# Patient Record
Sex: MALE | URBAN - METROPOLITAN AREA
[De-identification: names, ages, dates, MRNs, and addresses within clinical notes are randomized per-mention and may not be internally consistent; named-entity substitution may affect disease eponyms.]

---

## 2024-09-01 ENCOUNTER — CLAIMS CREATED FROM THE CLAIM WINDOW (OUTPATIENT)
Dept: URBAN - METROPOLITAN AREA MEDICAL CENTER 12 | Facility: MEDICAL CENTER | Age: 79
End: 2024-09-01
Payer: MEDICARE

## 2024-09-01 DIAGNOSIS — K92.1 HEMATOCHEZIA: ICD-10-CM

## 2024-09-01 DIAGNOSIS — K63.3 APHTHOUS ULCER OF COLON: ICD-10-CM

## 2024-09-01 DIAGNOSIS — E87.8 ELECTROLYTE IMBALANCE: ICD-10-CM

## 2024-09-01 DIAGNOSIS — D64.89 ANEMIA DUE TO OTHER CAUSE: ICD-10-CM

## 2024-09-01 PROCEDURE — 99233 SBSQ HOSP IP/OBS HIGH 50: CPT

## 2024-09-01 PROCEDURE — G0316 ESRD RELATED SVS 1VIS/12-19Y: HCPCS

## 2024-09-02 ENCOUNTER — CLAIMS CREATED FROM THE CLAIM WINDOW (OUTPATIENT)
Dept: URBAN - METROPOLITAN AREA MEDICAL CENTER 12 | Facility: MEDICAL CENTER | Age: 79
End: 2024-09-02
Payer: MEDICARE

## 2024-09-02 DIAGNOSIS — K63.3 APHTHOUS ULCER OF COLON: ICD-10-CM

## 2024-09-02 DIAGNOSIS — D64.89 ANEMIA DUE TO OTHER CAUSE: ICD-10-CM

## 2024-09-02 DIAGNOSIS — E87.8 ELECTROLYTE IMBALANCE: ICD-10-CM

## 2024-09-02 DIAGNOSIS — K92.1 HEMATOCHEZIA: ICD-10-CM

## 2024-09-02 PROCEDURE — 99233 SBSQ HOSP IP/OBS HIGH 50: CPT

## 2024-09-05 ENCOUNTER — DASHBOARD ENCOUNTERS (OUTPATIENT)
Age: 79
End: 2024-09-05

## 2024-09-05 ENCOUNTER — TELEPHONE ENCOUNTER (OUTPATIENT)
Dept: URBAN - METROPOLITAN AREA CLINIC 92 | Facility: CLINIC | Age: 79
End: 2024-09-05

## 2024-09-10 ENCOUNTER — TELEPHONE ENCOUNTER (OUTPATIENT)
Dept: URBAN - METROPOLITAN AREA CLINIC 92 | Facility: CLINIC | Age: 79
End: 2024-09-10

## 2024-09-11 ENCOUNTER — TELEPHONE ENCOUNTER (OUTPATIENT)
Dept: URBAN - METROPOLITAN AREA CLINIC 92 | Facility: CLINIC | Age: 79
End: 2024-09-11

## 2024-09-25 ENCOUNTER — OFFICE VISIT (OUTPATIENT)
Dept: URBAN - METROPOLITAN AREA TELEHEALTH 2 | Facility: TELEHEALTH | Age: 79
End: 2024-09-25

## 2024-09-25 VITALS — BODY MASS INDEX: 27.85 KG/M2 | WEIGHT: 188 LBS | HEIGHT: 69 IN

## 2024-09-25 RX ORDER — ROSUVASTATIN CALCIUM 40 MG/1
1 TABLET TABLET, FILM COATED ORAL ONCE A DAY
Status: ACTIVE | COMMUNITY

## 2024-09-25 RX ORDER — SERTRALINE HYDROCHLORIDE 100 MG/1
1 TABLET TABLET, FILM COATED ORAL ONCE A DAY
Status: ACTIVE | COMMUNITY

## 2024-09-25 RX ORDER — PANTOPRAZOLE SODIUM 40 MG/1
1 TABLET TABLET, DELAYED RELEASE ORAL ONCE A DAY
Status: ACTIVE | COMMUNITY

## 2024-09-25 RX ORDER — CLOPIDOGREL 75 MG/1
1 TABLET TABLET, FILM COATED ORAL ONCE A DAY
Status: ACTIVE | COMMUNITY

## 2024-09-25 RX ORDER — LISINOPRIL 10 MG/1
1 TABLET TABLET ORAL ONCE A DAY
Status: ACTIVE | COMMUNITY

## 2025-04-15 ENCOUNTER — APPOINTMENT (OUTPATIENT)
Dept: URBAN - METROPOLITAN AREA CLINIC 166 | Facility: CLINIC | Age: 80
Setting detail: DERMATOLOGY
End: 2025-04-15

## 2025-04-15 DIAGNOSIS — M71 OTHER BURSOPATHIES: ICD-10-CM

## 2025-04-15 DIAGNOSIS — L30.4 ERYTHEMA INTERTRIGO: ICD-10-CM

## 2025-04-15 PROBLEM — M71.341 OTHER BURSAL CYST, RIGHT HAND: Status: ACTIVE | Noted: 2025-04-15

## 2025-04-15 PROCEDURE — ? PRESCRIPTION

## 2025-04-15 PROCEDURE — ? PRESCRIPTION MEDICATION MANAGEMENT

## 2025-04-15 PROCEDURE — ? COUNSELING

## 2025-04-15 PROCEDURE — ? DEFER

## 2025-04-15 PROCEDURE — 99213 OFFICE O/P EST LOW 20 MIN: CPT

## 2025-04-15 RX ORDER — KETOCONAZOLE 20 MG/G
CREAM TOPICAL
Qty: 30 | Refills: 2 | Status: ERX | COMMUNITY
Start: 2025-04-15

## 2025-04-15 RX ADMIN — KETOCONAZOLE: 20 CREAM TOPICAL at 00:00

## 2025-04-15 ASSESSMENT — LOCATION SIMPLE DESCRIPTION DERM
LOCATION SIMPLE: GROIN
LOCATION SIMPLE: RIGHT THUMB

## 2025-04-15 ASSESSMENT — LOCATION DETAILED DESCRIPTION DERM
LOCATION DETAILED: RIGHT DISTAL DORSAL THUMB
LOCATION DETAILED: RIGHT INGUINAL CREASE
LOCATION DETAILED: LEFT INGUINAL CREASE

## 2025-04-15 ASSESSMENT — LOCATION ZONE DERM
LOCATION ZONE: TRUNK
LOCATION ZONE: FINGER

## 2025-04-15 NOTE — PROCEDURE: DEFER
Size Of Lesion In Cm (Optional): 1.2
Introduction Text (Please End With A Colon): The following procedure was deferred:
Detail Level: Detailed
X Size Of Lesion In Cm (Optional): 0

## 2025-04-15 NOTE — PROCEDURE: PRESCRIPTION MEDICATION MANAGEMENT
Initiate Treatment: Ketoconazole Cream to groin area BID until clear plus 1 week
Plan: H/o such. Declines exam. Requests refill on Ketoconazole. 
Detail Level: Zone
Render In Strict Bullet Format?: No